# Patient Record
Sex: MALE | Race: WHITE | NOT HISPANIC OR LATINO
[De-identification: names, ages, dates, MRNs, and addresses within clinical notes are randomized per-mention and may not be internally consistent; named-entity substitution may affect disease eponyms.]

---

## 2022-12-12 ENCOUNTER — NON-APPOINTMENT (OUTPATIENT)
Age: 42
End: 2022-12-12

## 2022-12-12 ENCOUNTER — APPOINTMENT (OUTPATIENT)
Dept: COLORECTAL SURGERY | Facility: CLINIC | Age: 42
End: 2022-12-12

## 2022-12-12 VITALS
HEART RATE: 77 BPM | BODY MASS INDEX: 22.91 KG/M2 | TEMPERATURE: 98.8 F | SYSTOLIC BLOOD PRESSURE: 110 MMHG | DIASTOLIC BLOOD PRESSURE: 74 MMHG | WEIGHT: 146 LBS | HEIGHT: 67 IN

## 2022-12-12 DIAGNOSIS — K62.5 HEMORRHAGE OF ANUS AND RECTUM: ICD-10-CM

## 2022-12-12 PROBLEM — Z00.00 ENCOUNTER FOR PREVENTIVE HEALTH EXAMINATION: Status: ACTIVE | Noted: 2022-12-12

## 2022-12-12 PROCEDURE — 99203 OFFICE O/P NEW LOW 30 MIN: CPT | Mod: 25

## 2022-12-12 PROCEDURE — 46600 DIAGNOSTIC ANOSCOPY SPX: CPT

## 2022-12-12 RX ORDER — HYDROCORTISONE 25 MG/G
2.5 CREAM TOPICAL
Qty: 30 | Refills: 0 | Status: ACTIVE | COMMUNITY
Start: 2022-11-03

## 2022-12-12 RX ORDER — LAMOTRIGINE 150 MG/1
150 TABLET ORAL
Qty: 90 | Refills: 0 | Status: DISCONTINUED | COMMUNITY
Start: 2022-07-25 | End: 2022-12-12

## 2022-12-12 RX ORDER — LAMOTRIGINE 100 MG/1
100 TABLET ORAL
Refills: 0 | Status: ACTIVE | COMMUNITY

## 2022-12-12 NOTE — HISTORY OF PRESENT ILLNESS
[FreeTextEntry1] : 43 y/o M presents for initial evaluation of possible hemorrhoids, referred by Dr. Haja Caballero\par PMH HLD, depression\par Denies PSH\par \par ?Last colonoscopy\par Denies FMH colorectal CA\par \par Reports h/o hemorrhoids for the last 10 years w/o obvious risk factor. Typically experienced swelling discomfort and eventually is able to manually reduce, has treated in the past w/ Prep H and witch hazel w/o much improvement\par Last flare ~ one month ago and had flare associated w/ painful swollen tissue. Recently treated w/ HC cream and symptoms great improved and now able to defecate more easily\par \par BH: typically once daily, admits lately has been having looser stools. H/o sitting on toilet bowl for up to 30-45 minutes\par Reports adequate intake of dietary fiber and water\par Takes fiber supplement now\par \par Denies ASA/NSAIDs in last 7 days\par Complains of mucus production for approximately 1 year.  Rectum.

## 2022-12-12 NOTE — ASSESSMENT
[FreeTextEntry1] : Rectal bleeding with questionable etiology.  I reviewed with the patient his symptoms may be attributable to a possible sexually-transmitted infection versus a segmental distal proctitis.\par \par We will await the results of the sexually-transmitted infection panel/cultures.  If negative advised starting a topical 5-ASA suppository therapy and plan for GI work-up including colonoscopy and biopsy for exclusion of possible ulcerative proctitis.

## 2022-12-12 NOTE — PHYSICAL EXAM
[Excoriation] : no perianal excoriation [Skin Tags] : residual hemorrhoidal skin tags were noted [Normal] : was normal [None] : there was no rectal mass  [FreeTextEntry1] : Medical assistant was present for the entire exam.\par \par Anoscopy was performed for evaluation of the patients rectal bleeding  history .\par The risks, benefits and alternatives were reviewed.\par \par A lighted anoscope was passed into the anal canal and the entire anal mucosal surface was inspected..  \par The findings revealed moderate internal hemorrhoids.\par Mild/moderate distal rectal mucosal edema and friability with mucus.  GC and viral cultures obtained.\par

## 2022-12-16 ENCOUNTER — TRANSCRIPTION ENCOUNTER (OUTPATIENT)
Age: 42
End: 2022-12-16

## 2022-12-19 DIAGNOSIS — K62.89 OTHER SPECIFIED DISEASES OF ANUS AND RECTUM: ICD-10-CM

## 2022-12-19 RX ORDER — MESALAMINE 1000 MG/1
1000 SUPPOSITORY RECTAL
Qty: 30 | Refills: 1 | Status: ACTIVE | COMMUNITY
Start: 2022-12-19 | End: 1900-01-01

## 2022-12-21 LAB
C TRACH RRNA SPEC QL NAA+PROBE: NOT DETECTED
HHV SPEC CULT: NORMAL
HSV TYPE 1: NORMAL
HSV TYPE 2: NORMAL
N GONORRHOEA RRNA SPEC QL NAA+PROBE: NOT DETECTED
SOURCE ANAL: NORMAL

## 2023-02-15 ENCOUNTER — TRANSCRIPTION ENCOUNTER (OUTPATIENT)
Age: 43
End: 2023-02-15

## 2023-03-02 ENCOUNTER — RX RENEWAL (OUTPATIENT)
Age: 43
End: 2023-03-02